# Patient Record
Sex: FEMALE | Race: WHITE | NOT HISPANIC OR LATINO | Employment: OTHER | URBAN - METROPOLITAN AREA
[De-identification: names, ages, dates, MRNs, and addresses within clinical notes are randomized per-mention and may not be internally consistent; named-entity substitution may affect disease eponyms.]

---

## 2021-05-19 NOTE — PATIENT DISCUSSION
Gutierrez Visual Field 36 point screen: I have reviewed the visual ferrara, performed by Dr. Ritesh Amaral, both taped and untaped on this patient which demonstrate significant obstruction of the patient's peripheral visual field on both eyes.

## 2022-06-10 ENCOUNTER — CONSULTATION/EVALUATION (OUTPATIENT)
Dept: URBAN - METROPOLITAN AREA CLINIC 46 | Facility: CLINIC | Age: 72
End: 2022-06-10

## 2022-06-10 DIAGNOSIS — H25.813: ICD-10-CM

## 2022-06-10 DIAGNOSIS — H35.372: ICD-10-CM

## 2022-06-10 PROCEDURE — V2799PMN IMPRIMIS PRED-MOXI-NEPAF 5ML

## 2022-06-10 PROCEDURE — 92025CV CORNEAL TOPOGRAPHY, CV

## 2022-06-10 PROCEDURE — 92136TC INTERFEROMETRY - TECHNICAL COMPONENT

## 2022-06-10 PROCEDURE — 92004 COMPRE OPH EXAM NEW PT 1/>: CPT

## 2022-06-10 PROCEDURE — 92134 CPTRZ OPH DX IMG PST SGM RTA: CPT

## 2022-06-10 ASSESSMENT — VISUAL ACUITY
OD_CC: 20/40
OS_SC: J8
OD_BAT: 20/50
OD_SC: J8
OS_AM: 20/25
OS_SC: 20/50
OS_BAT: 20/50
OS_CC: J2
OD_CC: J2
OD_RAM: 20/25
OS_CC: 20/50
OD_SC: 20/40

## 2022-06-10 ASSESSMENT — TONOMETRY
OD_IOP_MMHG: 19
OS_IOP_MMHG: 19

## 2022-08-08 ENCOUNTER — PRE-OP/H&P (OUTPATIENT)
Dept: URBAN - METROPOLITAN AREA CLINIC 39 | Facility: CLINIC | Age: 72
End: 2022-08-08

## 2022-08-08 ENCOUNTER — SURGERY/PROCEDURE (OUTPATIENT)
Dept: URBAN - METROPOLITAN AREA CLINIC 46 | Facility: CLINIC | Age: 72
End: 2022-08-08

## 2022-08-08 DIAGNOSIS — H25.813: ICD-10-CM

## 2022-08-08 DIAGNOSIS — H35.372: ICD-10-CM

## 2022-08-08 PROCEDURE — 6698454CV REMOVE CATARACT, INSERT LENS,SX ONLY, CUSTOM VISION

## 2022-08-08 PROCEDURE — 66999LNSR LENSAR LASER FOR CAT SX

## 2022-08-08 PROCEDURE — 99211HP H&P OFFICE/OUTPATIENT VISIT, EST

## 2022-08-09 ENCOUNTER — POST-OP (OUTPATIENT)
Dept: URBAN - METROPOLITAN AREA CLINIC 46 | Facility: CLINIC | Age: 72
End: 2022-08-09

## 2022-08-09 DIAGNOSIS — Z96.1: ICD-10-CM

## 2022-08-09 PROCEDURE — 99024 POSTOP FOLLOW-UP VISIT: CPT

## 2022-08-09 ASSESSMENT — VISUAL ACUITY
OD_SC: 20/70+2
OD_PH: 20/40+1
OD_SC: J12

## 2022-08-09 ASSESSMENT — TONOMETRY: OD_IOP_MMHG: 20

## 2022-08-15 ENCOUNTER — POST-OP (OUTPATIENT)
Dept: URBAN - METROPOLITAN AREA SURGERY 14 | Facility: SURGERY | Age: 72
End: 2022-08-15

## 2022-08-15 ENCOUNTER — SURGERY/PROCEDURE (OUTPATIENT)
Dept: URBAN - METROPOLITAN AREA CLINIC 46 | Facility: CLINIC | Age: 72
End: 2022-08-15

## 2022-08-15 ENCOUNTER — POST-OP (OUTPATIENT)
Dept: URBAN - METROPOLITAN AREA CLINIC 39 | Facility: CLINIC | Age: 72
End: 2022-08-15

## 2022-08-15 DIAGNOSIS — H25.812: ICD-10-CM

## 2022-08-15 DIAGNOSIS — Z96.1: ICD-10-CM

## 2022-08-15 PROCEDURE — 66999LNSR LENSAR LASER FOR CAT SX

## 2022-08-15 PROCEDURE — 6698454CV REMOVE CATARACT, INSERT LENS,SX ONLY, CUSTOM VISION

## 2022-08-15 ASSESSMENT — TONOMETRY: OD_IOP_MMHG: 19

## 2022-08-15 ASSESSMENT — VISUAL ACUITY
OD_SC: 20/40-1
OD_PH: 20/25

## 2022-08-16 ENCOUNTER — POST-OP (OUTPATIENT)
Dept: URBAN - METROPOLITAN AREA CLINIC 46 | Facility: CLINIC | Age: 72
End: 2022-08-16

## 2022-08-16 DIAGNOSIS — Z96.1: ICD-10-CM

## 2022-08-16 PROCEDURE — 99024 POSTOP FOLLOW-UP VISIT: CPT

## 2022-08-16 ASSESSMENT — TONOMETRY
OS_IOP_MMHG: 20
OD_IOP_MMHG: 19

## 2022-08-16 ASSESSMENT — VISUAL ACUITY
OS_SC: J4
OD_PH: 20/30+2
OU_SC: J3
OD_SC: J6
OU_SC: 20/30
OS_SC: 20/50
OS_PH: 20/30
OD_SC: 20/30

## 2022-09-07 ENCOUNTER — POST-OP (OUTPATIENT)
Dept: URBAN - METROPOLITAN AREA CLINIC 46 | Facility: CLINIC | Age: 72
End: 2022-09-07

## 2022-09-07 DIAGNOSIS — Z96.1: ICD-10-CM

## 2022-09-07 PROCEDURE — 99024 POSTOP FOLLOW-UP VISIT: CPT

## 2022-09-07 ASSESSMENT — TONOMETRY
OS_IOP_MMHG: 18
OD_IOP_MMHG: 18

## 2022-09-07 ASSESSMENT — VISUAL ACUITY
OS_SC: 20/50-1
OD_SC: J5
OS_SC: J5
OD_SC: 20/50
OU_SC: 20/40+2

## 2022-10-18 ENCOUNTER — POST-OP (OUTPATIENT)
Dept: URBAN - METROPOLITAN AREA CLINIC 45 | Facility: CLINIC | Age: 72
End: 2022-10-18

## 2022-10-18 DIAGNOSIS — Z96.1: ICD-10-CM

## 2022-10-18 PROCEDURE — 99024 POSTOP FOLLOW-UP VISIT: CPT

## 2022-10-18 ASSESSMENT — TONOMETRY
OS_IOP_MMHG: 17
OD_IOP_MMHG: 15

## 2022-10-18 ASSESSMENT — VISUAL ACUITY
OS_SC: 20/50-2
OS_PH: 20/30-1
OD_SC: 20/40-2
OS_SC: J10
OD_SC: J12
OD_PH: 20/40

## 2022-10-28 ENCOUNTER — POST-OP (OUTPATIENT)
Dept: URBAN - METROPOLITAN AREA CLINIC 36 | Facility: CLINIC | Age: 72
End: 2022-10-28

## 2022-10-28 DIAGNOSIS — H52.6: ICD-10-CM

## 2022-10-28 DIAGNOSIS — H04.123: ICD-10-CM

## 2022-10-28 DIAGNOSIS — Z96.1: ICD-10-CM

## 2022-10-28 DIAGNOSIS — H26.493: ICD-10-CM

## 2022-10-28 PROCEDURE — 99024 POSTOP FOLLOW-UP VISIT: CPT

## 2022-10-28 ASSESSMENT — VISUAL ACUITY
OD_SC: 20/50-1
OS_PH: 20/40
OS_BAT: 20/40 WITH MR
OS_SC: J6
OD_PH: 20/40
OS_SC: 20/50-2
OD_BAT: 20/50 WITH MR
OD_SC: J8

## 2022-10-28 ASSESSMENT — TONOMETRY
OS_IOP_MMHG: 16
OD_IOP_MMHG: 14

## 2022-11-09 ENCOUNTER — PRE-OP/H&P (OUTPATIENT)
Dept: URBAN - METROPOLITAN AREA SURGERY 14 | Facility: SURGERY | Age: 72
End: 2022-11-09

## 2022-11-09 ENCOUNTER — SURGERY/PROCEDURE (OUTPATIENT)
Dept: URBAN - METROPOLITAN AREA SURGERY 14 | Facility: SURGERY | Age: 72
End: 2022-11-09

## 2022-11-09 DIAGNOSIS — H26.493: ICD-10-CM

## 2022-11-09 PROCEDURE — 6682150 YAG CAPSULOTOMY

## 2022-11-09 PROCEDURE — 99499 UNLISTED E&M SERVICE: CPT

## 2022-11-16 ENCOUNTER — POST-OP (OUTPATIENT)
Dept: URBAN - METROPOLITAN AREA CLINIC 46 | Facility: CLINIC | Age: 72
End: 2022-11-16

## 2022-11-16 DIAGNOSIS — Z98.890: ICD-10-CM

## 2022-11-16 PROCEDURE — 99024 POSTOP FOLLOW-UP VISIT: CPT

## 2022-11-16 ASSESSMENT — VISUAL ACUITY
OS_CC: J2
OD_CC: J2
OD_SC: 20/30-2
OS_SC: 20/30-1

## 2022-11-16 ASSESSMENT — TONOMETRY
OD_IOP_MMHG: 13
OS_IOP_MMHG: 14

## 2022-12-02 ENCOUNTER — POST-OP (OUTPATIENT)
Dept: URBAN - METROPOLITAN AREA CLINIC 36 | Facility: CLINIC | Age: 72
End: 2022-12-02

## 2022-12-02 ASSESSMENT — TONOMETRY
OS_IOP_MMHG: 14
OD_IOP_MMHG: 14

## 2022-12-02 ASSESSMENT — VISUAL ACUITY
OD_SC: 20/40-1
OS_PH: 20/40
OD_CC: J2
OS_CC: J2
OS_SC: 20/40-1
OU_SC: 20/40+2
OD_PH: 20/30

## 2023-01-04 ENCOUNTER — SURGERY/PROCEDURE (OUTPATIENT)
Dept: URBAN - METROPOLITAN AREA CLINIC 39 | Facility: CLINIC | Age: 73
End: 2023-01-04

## 2023-01-04 DIAGNOSIS — H52.7: ICD-10-CM

## 2023-01-04 PROCEDURE — 66999ISPP INTRALASE LASIK S/P ADVANCED / CUSTOM VISION < 12 MONTHS

## 2023-01-05 ENCOUNTER — POST-OP (OUTPATIENT)
Dept: URBAN - METROPOLITAN AREA CLINIC 43 | Facility: CLINIC | Age: 73
End: 2023-01-05

## 2023-01-05 DIAGNOSIS — Z98.890: ICD-10-CM

## 2023-01-05 PROCEDURE — 6699955 NON-COMANAGED LASIK PO

## 2023-01-05 ASSESSMENT — VISUAL ACUITY
OD_SC: 20/30-1
OS_SC: 20/30+2

## 2023-01-12 ENCOUNTER — POST-OP (OUTPATIENT)
Dept: URBAN - METROPOLITAN AREA CLINIC 46 | Facility: CLINIC | Age: 73
End: 2023-01-12

## 2023-01-12 DIAGNOSIS — Z98.890: ICD-10-CM

## 2023-01-12 PROCEDURE — 99024 POSTOP FOLLOW-UP VISIT: CPT

## 2023-01-12 RX ORDER — PREDNISOLONE ACETATE 10 MG/ML
1 SUSPENSION/ DROPS OPHTHALMIC
End: 2023-01-12

## 2023-01-12 ASSESSMENT — TONOMETRY
OD_IOP_MMHG: 15
OS_IOP_MMHG: 15

## 2023-01-12 ASSESSMENT — VISUAL ACUITY
OS_SC: 20/30
OD_PH: 20/30
OD_SC: 20/40+1

## 2023-04-11 ENCOUNTER — FOLLOW UP (OUTPATIENT)
Dept: URBAN - METROPOLITAN AREA CLINIC 43 | Facility: CLINIC | Age: 73
End: 2023-04-11

## 2023-04-11 DIAGNOSIS — H04.123: ICD-10-CM

## 2023-04-11 DIAGNOSIS — Z98.890: ICD-10-CM

## 2023-04-11 PROCEDURE — 68761C PUNCTUM PLUG /COLLAGEN, EACH

## 2023-04-11 PROCEDURE — A4262 TEMPORARY TEAR DUCT PLUG: HCPCS

## 2023-04-11 PROCEDURE — 99214 OFFICE O/P EST MOD 30 MIN: CPT

## 2023-04-11 PROCEDURE — 92025 CPTRIZED CORNEAL TOPOGRAPHY: CPT

## 2023-04-11 RX ORDER — CYCLOSPORINE 0.5 MG/ML: 1 EMULSION OPHTHALMIC TWICE A DAY

## 2023-04-11 ASSESSMENT — VISUAL ACUITY
OS_SC: 20/30-2
OD_SC: 20/25+1

## 2023-06-27 ENCOUNTER — FOLLOW UP (OUTPATIENT)
Dept: URBAN - METROPOLITAN AREA CLINIC 43 | Facility: CLINIC | Age: 73
End: 2023-06-27

## 2023-06-27 DIAGNOSIS — Z98.890: ICD-10-CM

## 2023-06-27 DIAGNOSIS — H04.123: ICD-10-CM

## 2023-06-27 PROCEDURE — 99213 OFFICE O/P EST LOW 20 MIN: CPT

## 2023-06-27 ASSESSMENT — VISUAL ACUITY
OD_SC: 20/25+2
OS_SC: 20/25+1

## 2023-06-27 ASSESSMENT — TONOMETRY
OD_IOP_MMHG: 12
OS_IOP_MMHG: 12

## 2023-09-27 ENCOUNTER — FOLLOW UP (OUTPATIENT)
Dept: URBAN - METROPOLITAN AREA CLINIC 46 | Facility: CLINIC | Age: 73
End: 2023-09-27

## 2023-09-27 DIAGNOSIS — H35.372: ICD-10-CM

## 2023-09-27 DIAGNOSIS — Z98.890: ICD-10-CM

## 2023-09-27 DIAGNOSIS — H16.223: ICD-10-CM

## 2023-09-27 PROCEDURE — 92012 INTRM OPH EXAM EST PATIENT: CPT

## 2023-09-27 ASSESSMENT — VISUAL ACUITY
OU_SC: 20/30
OD_SC: 20/30
OS_SC: 20/30

## 2023-09-27 ASSESSMENT — TONOMETRY
OD_IOP_MMHG: 11
OS_IOP_MMHG: 11

## 2024-04-19 ENCOUNTER — COMPREHENSIVE EXAM (OUTPATIENT)
Dept: URBAN - METROPOLITAN AREA CLINIC 46 | Facility: CLINIC | Age: 74
End: 2024-04-19

## 2024-04-19 DIAGNOSIS — H16.223: ICD-10-CM

## 2024-04-19 DIAGNOSIS — Z98.890: ICD-10-CM

## 2024-04-19 DIAGNOSIS — H35.372: ICD-10-CM

## 2024-04-19 PROCEDURE — 92134 CPTRZ OPH DX IMG PST SGM RTA: CPT

## 2024-04-19 PROCEDURE — 92014 COMPRE OPH EXAM EST PT 1/>: CPT

## 2024-04-19 ASSESSMENT — VISUAL ACUITY
OD_SC: 20/20-1
OS_SC: J8
OD_CC: J3
OD_SC: J8
OS_CC: J3
OS_SC: 20/25-2

## 2024-04-19 ASSESSMENT — TONOMETRY
OD_IOP_MMHG: 14
OS_IOP_MMHG: 14

## 2025-04-22 ENCOUNTER — COMPREHENSIVE EXAM (OUTPATIENT)
Age: 75
End: 2025-04-22

## 2025-04-22 DIAGNOSIS — H52.7: ICD-10-CM

## 2025-04-22 DIAGNOSIS — H16.223: ICD-10-CM

## 2025-04-22 DIAGNOSIS — H35.372: ICD-10-CM

## 2025-04-22 DIAGNOSIS — Z98.890: ICD-10-CM

## 2025-04-22 PROCEDURE — 92014 COMPRE OPH EXAM EST PT 1/>: CPT

## 2025-04-22 PROCEDURE — 92015 DETERMINE REFRACTIVE STATE: CPT

## 2025-04-22 PROCEDURE — 92134 CPTRZ OPH DX IMG PST SGM RTA: CPT
